# Patient Record
(demographics unavailable — no encounter records)

---

## 2024-12-06 NOTE — HISTORY OF PRESENT ILLNESS
[FreeTextEntry1] : Nicola song a past medical history of HTN on losartan, atypical chest pain, fall with rib fracture pleural effusion thoracentesis x2, right knee pain degenerative arthritis, tick bite started on doxycycline, foot cellulitis completed long-term IV PICC line antibiotic therapy.  No history of CAD, MI, revascularization, VHD, CHF, TIA, CVA, diabetes, PVD, DVT, PE, arrhythmia, AF.  Patient has dyspnea with moderate exertion.  Cardiovascular review of symptoms is negative for exertional chest pain, palpitations, dizziness or syncope.  No PND or orthopnea leg edema.  No bleeding or black stool.  No exercise routine.  Patient has right knee pain limiting exercise.  Patient had a tick bite started on doxycycline, following up with PCP.  Coronary CTA May 2023, low risk coronary calcium score 20, nonobstructive coronaries.  Carotid and abdominal ultrasound May 2023, mild nonobstructive plaque, normal abdominal aortic size.   Echocardiogram April 2023 LVEF 55 to 60%, mild-moderate MR and AI.

## 2024-12-06 NOTE — DISCUSSION/SUMMARY
[FreeTextEntry1] : Nicola has the medical history detailed above and active medical issues including:  - Atypical chest pain resolved, nonobstructive coronary CTA with low risk coronary calcium score 20.  - Hypertension, average resting BP at guideline goal on losartan  - Knee pain degenerative arthritis limited ambulation  - Tick bite recently started on doxycycline following up with PCP  - Family history AAA  Advised patient to follow active lifestyle with regular cardiovascular exercise. Patient educated on heart healthy diet. Recommend increased oral hydration with electrolyte suppliment drinks, avoid excess alcohol and caffeine. Patient is aware to call with any symptoms or concerns.  Cardiology follow-up 6 months with echocardiogram and Doppler study  Current cardiac medications remain unchanged and renewals are up to date. Repeat labs will be ordered with PMD.  Nicola will follow-up with Dr. Vieyra for primary care.   Total time spent 45 minutes, reviewing of test results, chart information, patient discussion, physical exam and completion of chart documentation.

## 2024-12-18 NOTE — DISCUSSION/SUMMARY
[FreeTextEntry1] : Nicola has the medical history detailed above and active medical issues including:  - Atypical chest pain resolved, nonobstructive coronary CTA with low risk coronary calcium score 20.  - Hypertension, average resting BP at guideline goal on losartan  - Knee pain degenerative arthritis limited ambulation  - Tick bite recently started on doxycycline following up with PCP  - Family history AAA  Advised patient to follow active lifestyle with regular cardiovascular exercise. Patient educated on heart healthy diet. Recommend increased oral hydration with electrolyte suppliment drinks, avoid excess alcohol and caffeine. Patient is aware to call with any symptoms or concerns.  Cardiology follow-up 6 months   Current cardiac medications remain unchanged and renewals are up to date. Repeat labs will be ordered with PMD.  Nicola will follow-up with Dr. Vieyra for primary care.   Total time spent 45 minutes, reviewing of test results, chart information, patient discussion, physical exam and completion of chart documentation.

## 2024-12-18 NOTE — HISTORY OF PRESENT ILLNESS
[FreeTextEntry1] : Nicola song a past medical history of HTN on losartan, atypical chest pain, fall with rib fracture pleural effusion thoracentesis x2, right knee pain degenerative arthritis, tick bite started on doxycycline, foot cellulitis completed long-term IV PICC line antibiotic therapy.  No history of CAD, MI, revascularization, VHD, CHF, TIA, CVA, diabetes, PVD, DVT, PE, arrhythmia, AF.  Patient has dyspnea with moderate exertion.  Cardiovascular review of symptoms is negative for exertional chest pain, palpitations, dizziness or syncope.  No PND or orthopnea leg edema.  No bleeding or black stool.  No exercise routine.  Patient has right knee pain limiting exercise.  Patient had a tick bite started on doxycycline, following up with PCP.  Echocardiogram Dec 2024 LVEF 55 to 60%, mild MR TR, normal PASP.  Coronary CTA May 2023, low risk coronary calcium score 20, nonobstructive coronaries.  Carotid and abdominal ultrasound May 2023, mild nonobstructive plaque, normal abdominal aortic size.   Echocardiogram April 2023 LVEF 55 to 60%, mild-moderate MR and AI.

## 2025-04-30 NOTE — PHYSICAL EXAM
[Well Developed] : well developed [Well Nourished] : well nourished [No Acute Distress] : no acute distress [Normal Venous Pressure] : normal venous pressure [No Carotid Bruit] : no carotid bruit [Normal S1, S2] : normal S1, S2 [No Murmur] : no murmur [No Rub] : no rub [No Gallop] : no gallop [Clear Lung Fields] : clear lung fields [Good Air Entry] : good air entry [No Respiratory Distress] : no respiratory distress  [Soft] : abdomen soft [Non Tender] : non-tender [No Masses/organomegaly] : no masses/organomegaly [Normal Bowel Sounds] : normal bowel sounds [Normal Gait] : normal gait [No Edema] : no edema [No Cyanosis] : no cyanosis [No Clubbing] : no clubbing [No Varicosities] : no varicosities [No Rash] : no rash [No Skin Lesions] : no skin lesions [Moves all extremities] : moves all extremities [No Focal Deficits] : no focal deficits [Normal Speech] : normal speech [Alert and Oriented] : alert and oriented [Normal memory] : normal memory [General Appearance - Well Developed] : well developed [Normal Appearance] : normal appearance [Well Groomed] : well groomed [General Appearance - Well Nourished] : well nourished [No Deformities] : no deformities [General Appearance - In No Acute Distress] : no acute distress [Normal Conjunctiva] : the conjunctiva exhibited no abnormalities [Eyelids - No Xanthelasma] : the eyelids demonstrated no xanthelasmas [Normal Oral Mucosa] : normal oral mucosa [No Oral Pallor] : no oral pallor [No Oral Cyanosis] : no oral cyanosis [Normal Jugular Venous A Waves Present] : normal jugular venous A waves present [Normal Jugular Venous V Waves Present] : normal jugular venous V waves present [No Jugular Venous Nava A Waves] : no jugular venous nava A waves [Respiration, Rhythm And Depth] : normal respiratory rhythm and effort [Exaggerated Use Of Accessory Muscles For Inspiration] : no accessory muscle use [Auscultation Breath Sounds / Voice Sounds] : lungs were clear to auscultation bilaterally [Heart Rate And Rhythm] : heart rate and rhythm were normal [Heart Sounds] : normal S1 and S2 [Murmurs] : no murmurs present [Abdomen Soft] : soft [Abdomen Tenderness] : non-tender [Abdomen Mass (___ Cm)] : no abdominal mass palpated [Abnormal Walk] : normal gait [Gait - Sufficient For Exercise Testing] : the gait was sufficient for exercise testing [Nail Clubbing] : no clubbing of the fingernails [Cyanosis, Localized] : no localized cyanosis [Petechial Hemorrhages (___cm)] : no petechial hemorrhages [Skin Color & Pigmentation] : normal skin color and pigmentation [] : no rash [No Venous Stasis] : no venous stasis [Skin Lesions] : no skin lesions [No Skin Ulcers] : no skin ulcer [No Xanthoma] : no  xanthoma was observed [Oriented To Time, Place, And Person] : oriented to person, place, and time [Affect] : the affect was normal [Mood] : the mood was normal [No Anxiety] : not feeling anxious

## 2025-05-07 NOTE — HISTORY OF PRESENT ILLNESS
[FreeTextEntry1] : Nicola song a past medical history of HTN on losartan, atypical chest pain, fall with rib fracture pleural effusion thoracentesis x2, right knee pain degenerative arthritis, tick bite started on doxycycline, foot cellulitis completed long-term IV PICC line antibiotic therapy.  No history of CAD, MI, revascularization, VHD, CHF, TIA, CVA, diabetes, PVD, DVT, PE, arrhythmia, AF.  Cardiovascular review of symptoms is negative for exertional chest pain, dyspnea, palpitations, dizziness or syncope.  No PND or orthopnea leg edema.  No bleeding or black stool.  No exercise routine.  Patient has right knee pain limiting exercise.  Patient had a tick bite started on doxycycline, following up with PCP.  Echocardiogram Dec 2024 LVEF 55 to 60%, mild MR TR, normal PASP.  Coronary CTA May 2023, low risk coronary calcium score 20, nonobstructive coronaries.  Carotid and abdominal ultrasound May 2023, mild nonobstructive plaque, normal abdominal aortic size.   Echocardiogram April 2023 LVEF 55 to 60%, mild-moderate MR and AI.

## 2025-05-07 NOTE — DISCUSSION/SUMMARY
[FreeTextEntry1] : Nicola has the medical history detailed above and active medical issues including:  - Atypical chest pain resolved, nonobstructive coronary CTA with low risk coronary calcium score 20 May 2023.  - Hypertension, average resting BP at guideline goal on losartan  - Knee pain degenerative arthritis limited ambulation  - Tick bite recently started on doxycycline following up with PCP  - Family history AAA  Advised patient to follow active lifestyle with regular cardiovascular exercise. Patient educated on heart healthy diet. Recommend increased oral hydration with electrolyte suppliment drinks, avoid excess alcohol and caffeine. Patient is aware to call with any symptoms or concerns.  Cardiology follow-up 6 months   Current cardiac medications remain unchanged and renewals are up to date. Repeat labs will be ordered with PMD.  Nicola will follow-up with Dr. Vieyra for primary care.   Total time spent 45 minutes, reviewing of test results, chart information, patient discussion, physical exam and completion of chart documentation.

## 2025-06-13 NOTE — REASON FOR VISIT
[Initial Evaluation] : an initial evaluation [FreeTextEntry1] : Trigeminal pain and peripheral neuropathy

## 2025-06-13 NOTE — ASSESSMENT
[FreeTextEntry1] : 80 yyear old male with pmhx of carpal tunnel ssyndrome, mitral valve prolapse, tricuspid insufficiency, and HTN presents for trigeminal neuralgia and neuropathy in his hands and feet.  Trigeminal nerve pain starting roughly 1 month ago at night with sharp shooting pain.  Seen by dentist, testing found to be negative for any dental involvement.  Severity and frequency has reduced however sometimes feels like tongue is swollen or inflamed but also feels possibly left side of mouth is numb.  Also experiencing peripheral neuropathy in tips of fingers as well as his feet.  Feels unsteady on his feet due to lack of sensation.  No tingling or pain.  Neuropathy started roughly 6 to 12 months ago.  Impression: Likely trigeminal neuralgia and peripheral neuropathy.  Plan: - MRI brain with and without contrast to evaluate for vascular compression or other structural abnormality. - Lab work to evaluate for metabolic causes of neuropathy. - EMG upper extremity and lower extremity to evaluate for nerve dysfunction. - Patient would not like to start medication for trigeminal neuralgia as he feels pain is manageable and is not want to risk side effects at this time.  Advised to call if he experiences worsening of symptoms or would like to start medication.  Extensive education and counseling done as per my usual protocol - relevant to the neurological issues above. Patient's questions and concerns were addressed, he/she voiced understanding.  Prior to appointment and during encounter with patient extensive medical records were reviewed including but not limited to, Hospital records, out patient records, laboratory data as well as extensive time was also spent in reviewing diagnostic studies.  Total time spent on the day of the visit, including pre-visit and post-visit time was 60 minutes.

## 2025-06-13 NOTE — HISTORY OF PRESENT ILLNESS
[FreeTextEntry1] : 80 yyear old male with pmhx of carpal tunnel ssyndrome, mitral valve prolapse, tricuspid insufficiency, and HTN presents for trigeminal neuralgia and neuropathy in his hands and feet.  Trigeminal neuralgia symptoms started roughly 1 month ago.  He states 1 night while he was sleeping he was woken up at 1 or 2 in the morning by an extremely sudden shocklike pain that radiated from his jaw all the way up to the top of his head.  He states that it was only lasting brief instances however kept repeating himself every few minutes.  This went on for about an hour.  This would go on following this pattern for approximately 1 week.  Went to see his dentist who performed x-rays as well as other clinical test to see if any dental work in the past had exacerbated trigeminal nerve.  There test were found to be negative and they referred him here.  Since seeing his dentist patient has noticed pain has decreased in frequency and severity however notices that there are times when the left side of his tongue feels like it is swollen and hurting however the inside of his mouth is also sometimes numb on the left side so it is hard to tell if it is actually swollen or if he is having effects from his mouth being numb.  The pain is no longer as bothersome to him and he is able to push through it but he does feel there are times where he cannot talk due to the sensation that his tongue is swollen.  Patient experiences numbness in the bottoms of his feet as well as the tips of his fingers.  This started within the last 6 to 12 months.  He states that he has trouble walking and maintaining his balance due to the lack of sensation in the bottom of his feet.  He does not feel like his legs are weak or that his foot is weak however is concerned that he will miss a step if he is not paying attention.  He has had severe injuries to his left leg at some point requiring surgical intervention and preparation of his knee. however never noticed any nerve damage until recently.  He does not think that the accident had anything to do with this new numbness.  He does not experience any pain in his feet when walking, just concerned with the numbness.  Denies dizziness, vision, hearing issues/tinnitus, HA, CP, SOB, focal weakness, change in bowel, bladder habits, trauma, LOC, fever, chills.

## 2025-06-13 NOTE — PHYSICAL EXAM
[FreeTextEntry1] : GENERAL PHYSICAL EXAM: GEN: no acute distress, normal affect EYES:  sclera white, conjunctiva clear PULM: no respiratory distress, normal rate EXT: no edema, no cyanosis Cranial:  no supraorbital, temporal, or occipital region tenderness. No popping or clicking of TMJ Neck: Normal ROM, no muscle spasm, tenderness SKIN: warm, dry, no rash or lesion on exposed skin   NEUROLOGICAL EXAM: Mental Status Orientation: alert and oriented to person, place, time, and situation Language: clear and fluent, intact comprehension and repetition   Cranial Nerves II: visual fields full to confrontation III, IV, VI:  PERRL, EOMI, VFF, no nystagmus, no ptosis V, VII: facial sensation and movement intact and symmetric VIII: hearing intact to finger rub bilat IX, X: uvula midline, soft palate elevates normally XI: BL shoulder shrug intact, lateral head movement 5/5 bilat XII: tongue midline   Motor: Bilateral muscle strength 5/5 in UE and LE, proximally and distally, symmetric throughout, except left lower extremity range of knee flexion and extension limited due to surgical preparation of left knee from traumatic accident in past. Tone and bulk are normal in upper and lower limbs. No abnormal movements   Sensation: Bilateral feet exhibiting decreased sensation to light touch, temperature, vibratory sensation, sharp dull differentiation.  Tips of bilateral fingers decree sensation to light touch.   Coordination: Normal FNF bilateral, Negative Romberg   Reflex: 2+ in BL biceps, brachioradialis, triceps, patella, Achilles. Babinski absent bilat   Gait Antalgic gait favoring left side due to surgical preparation of left knee from traumatic accident.  This is baseline per patient.